# Patient Record
Sex: FEMALE | Race: BLACK OR AFRICAN AMERICAN | NOT HISPANIC OR LATINO | Employment: PART TIME | URBAN - METROPOLITAN AREA
[De-identification: names, ages, dates, MRNs, and addresses within clinical notes are randomized per-mention and may not be internally consistent; named-entity substitution may affect disease eponyms.]

---

## 2023-12-22 ENCOUNTER — HOSPITAL ENCOUNTER (EMERGENCY)
Facility: HOSPITAL | Age: 43
Discharge: HOME | End: 2023-12-22
Payer: COMMERCIAL

## 2023-12-22 VITALS
SYSTOLIC BLOOD PRESSURE: 106 MMHG | HEART RATE: 72 BPM | TEMPERATURE: 97.9 F | OXYGEN SATURATION: 100 % | WEIGHT: 135 LBS | HEIGHT: 66 IN | DIASTOLIC BLOOD PRESSURE: 54 MMHG | RESPIRATION RATE: 18 BRPM | BODY MASS INDEX: 21.69 KG/M2

## 2023-12-22 DIAGNOSIS — B34.9 VIRAL ILLNESS: Primary | ICD-10-CM

## 2023-12-22 LAB
FLUAV RNA RESP QL NAA+PROBE: NOT DETECTED
FLUBV RNA RESP QL NAA+PROBE: NOT DETECTED
PREGNANCY TEST URINE, POC: NEGATIVE
SARS-COV-2 RNA RESP QL NAA+PROBE: NOT DETECTED

## 2023-12-22 PROCEDURE — 2500000005 HC RX 250 GENERAL PHARMACY W/O HCPCS: Mod: SE | Performed by: PHYSICIAN ASSISTANT

## 2023-12-22 PROCEDURE — 81025 URINE PREGNANCY TEST: CPT | Performed by: PHYSICIAN ASSISTANT

## 2023-12-22 PROCEDURE — 87635 SARS-COV-2 COVID-19 AMP PRB: CPT | Performed by: PHYSICIAN ASSISTANT

## 2023-12-22 PROCEDURE — 99284 EMERGENCY DEPT VISIT MOD MDM: CPT | Performed by: PHYSICIAN ASSISTANT

## 2023-12-22 PROCEDURE — 99283 EMERGENCY DEPT VISIT LOW MDM: CPT

## 2023-12-22 RX ORDER — ONDANSETRON 4 MG/1
4 TABLET, ORALLY DISINTEGRATING ORAL EVERY 8 HOURS PRN
Qty: 30 TABLET | Refills: 0 | Status: SHIPPED | OUTPATIENT
Start: 2023-12-22

## 2023-12-22 RX ORDER — ACETAMINOPHEN 325 MG/1
650 TABLET ORAL EVERY 6 HOURS PRN
Qty: 30 TABLET | Refills: 0 | Status: SHIPPED | OUTPATIENT
Start: 2023-12-22

## 2023-12-22 RX ORDER — ONDANSETRON 4 MG/1
4 TABLET, ORALLY DISINTEGRATING ORAL ONCE
Status: COMPLETED | OUTPATIENT
Start: 2023-12-22 | End: 2023-12-22

## 2023-12-22 RX ORDER — ACETAMINOPHEN 325 MG/1
975 TABLET ORAL ONCE
Status: COMPLETED | OUTPATIENT
Start: 2023-12-22 | End: 2023-12-22

## 2023-12-22 RX ORDER — IBUPROFEN 600 MG/1
600 TABLET ORAL EVERY 6 HOURS PRN
Qty: 30 TABLET | Refills: 0 | OUTPATIENT
Start: 2023-12-22 | End: 2024-03-02

## 2023-12-22 RX ORDER — CYCLOBENZAPRINE HCL 10 MG
10 TABLET ORAL 3 TIMES DAILY PRN
Qty: 20 TABLET | Refills: 0 | Status: SHIPPED | OUTPATIENT
Start: 2023-12-22

## 2023-12-22 RX ADMIN — ONDANSETRON 4 MG: 4 TABLET, ORALLY DISINTEGRATING ORAL at 07:57

## 2023-12-22 RX ADMIN — ACETAMINOPHEN 975 MG: 325 TABLET ORAL at 07:57

## 2023-12-22 ASSESSMENT — COLUMBIA-SUICIDE SEVERITY RATING SCALE - C-SSRS
6. HAVE YOU EVER DONE ANYTHING, STARTED TO DO ANYTHING, OR PREPARED TO DO ANYTHING TO END YOUR LIFE?: NO
1. IN THE PAST MONTH, HAVE YOU WISHED YOU WERE DEAD OR WISHED YOU COULD GO TO SLEEP AND NOT WAKE UP?: NO
2. HAVE YOU ACTUALLY HAD ANY THOUGHTS OF KILLING YOURSELF?: NO

## 2023-12-22 ASSESSMENT — PAIN DESCRIPTION - LOCATION: LOCATION: ABDOMEN

## 2023-12-22 ASSESSMENT — PAIN SCALES - GENERAL: PAINLEVEL_OUTOF10: 6

## 2023-12-22 ASSESSMENT — PAIN - FUNCTIONAL ASSESSMENT: PAIN_FUNCTIONAL_ASSESSMENT: 0-10

## 2023-12-22 NOTE — Clinical Note
Erica Santos was seen and treated in our emergency department on 12/22/2023.  She may return to work on 12/24/2023.       If you have any questions or concerns, please don't hesitate to call.      Ilir Giraldo PA-C

## 2023-12-22 NOTE — ED PROVIDER NOTES
HPI:  43-year-old female otherwise healthy presents complaining of diarrhea.  States she had a couple episodes since began yesterday.  States she has some abdominal cramps with the diarrhea.  States she has mild diffuse myalgias worse in her back.  Denies any fevers, chills, night sweats or rigors.  Has some nausea without vomiting.  States the diarrhea is nonbloody.  Has not taken anything for her symptoms.  Denies any recent travel or sick contacts.  States she had a cough last week however that has resolved on its own.  Denies any chest pain or shortness of breath.  Denies alcohol tobacco or drug use    Physical Exam:   GEN: Vitals noted. NAD, very well-appearing able to comfortably  EYES:  EOMs grossly intact, anicteric sclera  LISS: Mucosa moist.  NECK: Supple.  CARD: RRR  PULMONARY: Moving air well. Clear all lung fields.  ABDOMEN: Soft, no guarding, no rigidity. Nontender. NABS  EXTREMITIES: Full ROM, no pitting edema,   SKIN: Intact, warm and dry  NEURO: Alert and oriented x 3, speech is clear, no obvious deficits noted.       ----------------------------------------------------------------------------------------------------------------------------    MDM:  43-year-old female presenting for diarrhea with myalgias.  On exam she is well-appearing ambulating comfortably.  Vital signs stable.  ABD soft NTTP with NABS.  Her constellation of symptoms reminiscent most likely a viral illness including COVID-19.  Will treat her symptomatically and test for influenza and COVID.  Urine pregnancy is negative.  With a benign abdomen well-appearing with stable vital signs, I have low suspicion for acute abdomen including low suspicion for SBO, appendicitis, cholecystitis, ischemic gut, diverticulitis, pancreatitis.  No indication for laboratory work or advanced imaging as she had only few episodes of the diarrhea and I do not suspect any electrolyte derangement additionally with her being very well-appearing with no signs  of clinical dehydration.  COVID and flu were negative.  On reevaluation states she felt greatly improved.  Felt comfortable to be discharged home.  She tolerated p.o. well without any difficulty.  Told to take Imodium for any diarrhea that is not accompanied with blood or fever.  To follow-up with PCP as needed.  Return precautions reviewed.    No orders to display     Labs Reviewed   SARS-COV-2 PCR, SYMPTOMATIC - Normal       Result Value    Coronavirus 2019, PCR Not Detected      Narrative:     This assay has received FDA Emergency Use Authorization (EUA) and is only authorized for the duration of time that circumstances exist to justify the authorization of the emergency use of in vitro diagnostic tests for the detection of SARS-CoV-2 virus and/or diagnosis of COVID-19 infection under section 564(b)(1) of the Act, 21 U.S.C. 360bbb-3(b)(1). This assay is an in vitro diagnostic nucleic acid amplification test for the qualitative detection of SARS-CoV-2 from nasopharyngeal specimens and has been validated for use at [facility]. Negative results do not preclude COVID-19 infections and should not be used as the sole basis for diagnosis, treatment, or other management decisions.     INFLUENZA A AND B PCR - Normal    Flu A Result Not Detected      Flu B Result Not Detected      Narrative:     This assay is an in vitro diagnostic multiplex nucleic acid amplification test for the detection and discrimination of Influenza A & B from nasopharyngeal specimens, and has been validated for use at [facility]. Negative results do not preclude Influenza A/B infections, and should not be used as the sole basis for diagnosis, treatment, or other management decisions. If Influenza A/B and RSV PCR results are negative, testing for Parainfluenza virus, Adenovirus and Metapneumovirus is routinely performed for [facility] pediatric oncology and intensive care inpatients, and is available on other  patients by placing an add-on request.   POCT PREGNANCY, URINE - Normal    Preg Test, Ur Negative       Diagnoses as of 12/22/23 0933   Viral illness     ----------------------------------------------------------------------------------------------------------------------------    This note was dictated using a speech recognition program.  While an attempt was made at proof reading to minimize errors, minor errors in transcription may be present call for questions.     Ilir Giraldo PA-C  12/22/23 0934

## 2023-12-22 NOTE — ED TRIAGE NOTES
Pt having abd pain and diarrhea since yesterday  
Spine appears normal, movement of extremities grossly intact.

## 2023-12-22 NOTE — DISCHARGE INSTRUCTIONS
Take medications to help your symptoms.  Get plenty rest and stay well-hydrated.  If you have persistent symptoms follow-up with your PCP, if you needed 1 call the number listed below.

## 2024-03-02 ENCOUNTER — APPOINTMENT (OUTPATIENT)
Dept: RADIOLOGY | Facility: HOSPITAL | Age: 44
End: 2024-03-02
Payer: COMMERCIAL

## 2024-03-02 ENCOUNTER — HOSPITAL ENCOUNTER (EMERGENCY)
Facility: HOSPITAL | Age: 44
Discharge: HOME | End: 2024-03-02
Attending: EMERGENCY MEDICINE
Payer: COMMERCIAL

## 2024-03-02 VITALS
DIASTOLIC BLOOD PRESSURE: 73 MMHG | HEART RATE: 100 BPM | HEIGHT: 66 IN | TEMPERATURE: 98.2 F | SYSTOLIC BLOOD PRESSURE: 121 MMHG | OXYGEN SATURATION: 100 % | RESPIRATION RATE: 14 BRPM | BODY MASS INDEX: 22.5 KG/M2 | WEIGHT: 140 LBS

## 2024-03-02 DIAGNOSIS — S29.012A STRAIN OF RHOMBOID MUSCLE, INITIAL ENCOUNTER: Primary | ICD-10-CM

## 2024-03-02 PROCEDURE — 99284 EMERGENCY DEPT VISIT MOD MDM: CPT | Performed by: EMERGENCY MEDICINE

## 2024-03-02 PROCEDURE — 2500000004 HC RX 250 GENERAL PHARMACY W/ HCPCS (ALT 636 FOR OP/ED): Mod: SE

## 2024-03-02 PROCEDURE — 73030 X-RAY EXAM OF SHOULDER: CPT | Mod: LEFT SIDE | Performed by: RADIOLOGY

## 2024-03-02 PROCEDURE — 96372 THER/PROPH/DIAG INJ SC/IM: CPT

## 2024-03-02 PROCEDURE — 99283 EMERGENCY DEPT VISIT LOW MDM: CPT

## 2024-03-02 PROCEDURE — 73030 X-RAY EXAM OF SHOULDER: CPT | Mod: LT

## 2024-03-02 RX ORDER — IBUPROFEN 600 MG/1
600 TABLET ORAL EVERY 6 HOURS PRN
Qty: 28 TABLET | Refills: 0 | Status: SHIPPED | OUTPATIENT
Start: 2024-03-02 | End: 2024-03-09

## 2024-03-02 RX ORDER — ACETAMINOPHEN 325 MG/1
650 TABLET ORAL ONCE
Status: COMPLETED | OUTPATIENT
Start: 2024-03-02 | End: 2024-03-02

## 2024-03-02 RX ORDER — KETOROLAC TROMETHAMINE 30 MG/ML
30 INJECTION, SOLUTION INTRAMUSCULAR; INTRAVENOUS ONCE
Status: COMPLETED | OUTPATIENT
Start: 2024-03-02 | End: 2024-03-02

## 2024-03-02 RX ORDER — LIDOCAINE 50 MG/G
1 PATCH TOPICAL DAILY
Qty: 12 PATCH | Refills: 0 | Status: SHIPPED | OUTPATIENT
Start: 2024-03-02 | End: 2024-04-01

## 2024-03-02 RX ORDER — LIDOCAINE 560 MG/1
1 PATCH PERCUTANEOUS; TOPICAL; TRANSDERMAL ONCE
Status: DISCONTINUED | OUTPATIENT
Start: 2024-03-02 | End: 2024-03-02 | Stop reason: HOSPADM

## 2024-03-02 RX ADMIN — ACETAMINOPHEN 650 MG: 325 TABLET ORAL at 17:12

## 2024-03-02 RX ADMIN — KETOROLAC TROMETHAMINE 30 MG: 30 INJECTION, SOLUTION INTRAMUSCULAR at 17:12

## 2024-03-02 ASSESSMENT — COLUMBIA-SUICIDE SEVERITY RATING SCALE - C-SSRS
2. HAVE YOU ACTUALLY HAD ANY THOUGHTS OF KILLING YOURSELF?: NO
6. HAVE YOU EVER DONE ANYTHING, STARTED TO DO ANYTHING, OR PREPARED TO DO ANYTHING TO END YOUR LIFE?: NO
1. IN THE PAST MONTH, HAVE YOU WISHED YOU WERE DEAD OR WISHED YOU COULD GO TO SLEEP AND NOT WAKE UP?: NO

## 2024-03-02 ASSESSMENT — PAIN - FUNCTIONAL ASSESSMENT: PAIN_FUNCTIONAL_ASSESSMENT: 0-10

## 2024-03-02 ASSESSMENT — PAIN SCALES - GENERAL: PAINLEVEL_OUTOF10: 8

## 2024-03-02 NOTE — ED PROVIDER NOTES
"HPI   Chief Complaint   Patient presents with    Shoulder Pain     Reports turning body awkwardly and now c/o left shoulder pain       HPI     Patient is a 43-year-old female with no reported past medical history presenting to the emergency department today for left shoulder pain. History is collected from patient. She states that yesterday around 9 PM, she was getting into her car when she turned her body and \"away\" resulting in posterior shoulder pain. She states that she did not hit her shoulder on anything and has never had shoulder problems in the past. She works at [employer] and was not able to go to work yesterday due to the being unable to lift things. She describes the pain as being crampy and behind her shoulder blade. Pain is not actually for the shoulder itself but rather medial to the scapula and lateral to the spine on the left side. She denies use of medications or any other known mechanisms of injury. Denies any history of allergies or other past medical history.               Felisha Coma Scale Score: 15                     Patient History   Past Medical History:   Diagnosis Date    37 weeks gestation of pregnancy 2022    37 weeks gestation of pregnancy    Encounter for pregnancy test, result negative 2018    Negative pregnancy test    Encounter for supervision of normal pregnancy, unspecified, third trimester 2022    Encounter for pregnancy related examination, third trimester    Personal history of other diseases of the digestive system 2015    History of hemorrhoids     Past Surgical History:   Procedure Laterality Date     SECTION, CLASSIC  2015     Section     SECTION, CLASSIC  2015     Section     No family history on file.  Social History     Tobacco Use    Smoking status: Every Day     Types: Cigarettes    Smokeless tobacco: Never   Substance Use Topics    Alcohol use: Not on file    Drug use: Not on file       Physical Exam "   ED Triage Vitals [03/02/24 1546]   Temperature Heart Rate Respirations BP   36.8 °C (98.2 °F) 100 14 121/73      Pulse Ox Temp Source Heart Rate Source Patient Position   100 % Temporal Monitor Sitting      BP Location FiO2 (%)     Right arm --       Physical Exam  Constitutional:       Appearance: Normal appearance.   HENT:      Head: Normocephalic and atraumatic.   Eyes:      Extraocular Movements: Extraocular movements intact.      Pupils: Pupils are equal, round, and reactive to light.   Cardiovascular:      Rate and Rhythm: Normal rate and regular rhythm.   Pulmonary:      Effort: Pulmonary effort is normal.      Breath sounds: Normal breath sounds.   Abdominal:      General: Abdomen is flat.      Palpations: Abdomen is soft.   Musculoskeletal:      Cervical back: Normal range of motion and neck supple. No tenderness.      Comments: There is tenderness in the recess between the medial portion of the left shoulder blade and the lateral spine overlying where the rhomboid muscles lie. No overlying ecchymoses or rashes. No bony tenderness or deformity. Patient is able to perform most shoulder maneuvers, but has severe pain on scapular retraction. Radial pulses +2 bilaterally and she is neurovascularly intact distal to her pain on the left upper extremity with 5 out of 5 motor strength in bilateral hands.   Neurological:      General: No focal deficit present.      Mental Status: She is alert and oriented to person, place, and time.         ED Course & MDM   Diagnoses as of 03/02/24 1756   Strain of rhomboid muscle, initial encounter       Medical Decision Making  Patient is a 43-year-old female with past medical history no reported past medical history presenting to the emergency department for left shoulder pain. On exam, there is no bony tenderness and she has neurovascularly intact in the left upper extremity. Given that pain is reproducible with scapular retraction and a low risk mechanism of injury, suspect  likely muscular etiology of pain. There are no overlying skin changes. As a precaution and due to patient's wishes, left upper extremity shoulder x-ray will be obtained. For symptomatic control, patient given 650 mg of Tylenol and 30 mg of IM Toradol. Patient also provided heating pack to place over left shoulder for symptomatic control. Lung sounds within normal limits and patient has no respiratory symptoms making pulmonary etiology unlikely. Additionally, patient's pain is reproducible with physical movements and when she is at rest does not have pain which would be uncharacteristic of cardiac etiology such as dissection. She is hemodynamically stable.  Left shoulder x-ray was obtained showing no evidence of osseous injury. Patient was notified of results and that symptoms are most consistent with muscle strain of likely her rhomboids given her pain on scapular retraction. For symptomatic control, patient will be discharged home with a prescription for ibuprofen and 5% lidocaine patches. As a result of the work-up, patient was discharged home.  They were informed of their diagnosis and instructed to come back with any concerns or worsening of condition and was agreeable to the plan as discussed above.  The patient was given the opportunity to ask questions.  All of the patient's questions were answered.  The patient remained stable under my care.      Procedure  Procedures     Dominick Liu MD  Resident  03/02/24 [id number]

## 2024-03-02 NOTE — Clinical Note
Erica Santos was seen and treated in our emergency department on 3/2/2024.  She may return to work on 03/05/2024.       If you have any questions or concerns, please don't hesitate to call.      Dominick Liu MD

## 2024-03-20 ENCOUNTER — APPOINTMENT (OUTPATIENT)
Dept: PRIMARY CARE | Facility: HOSPITAL | Age: 44
End: 2024-03-20
Payer: COMMERCIAL

## 2024-03-20 NOTE — PROGRESS NOTES
Erica Santos is a 43 y.o. with no significant PMH seen at [facility] on 03/20/24 for     Subjective:  Acute Concerns:   ***    HPI:     ROS:   ***    Past Medical History:     Past Surgical History:    Medications: *** (MVI, Vitamin D)    Current Outpatient Medications:     acetaminophen (TylenoL) 325 mg tablet, Take 2 tablets (650 mg) by mouth every 6 hours if needed for mild pain (1 - 3) or fever (temp greater than 38.0 C)., Disp: 30 tablet, Rfl: 0    cyclobenzaprine (Flexeril) 10 mg tablet, Take 1 tablet (10 mg) by mouth 3 times a day as needed for muscle spasms (Pain)., Disp: 20 tablet, Rfl: 0    lidocaine (Lidoderm) 5 % patch, Place 1 patch over 12 hours on the skin once daily. Apply one patch topically once a day for 30 days, Disp: 12 patch, Rfl: 0    ondansetron ODT (Zofran-ODT) 4 mg disintegrating tablet, Take 1 tablet (4 mg) by mouth every 8 hours if needed for nausea or vomiting. 1-2 tablets every 8 hours as needed for nausea and/or vomiting, Disp: 30 tablet, Rfl: 0  Pharmacy: ***    Objective:  Visit Vitals  Smoking Status Every Day        PHYSICAL EXAM:   General: well appearing, NAD, pleasant and engaged in encounter    HEENT: NCAT, MMM  CV: RRR, no m/r/g, cap refill <2 secs  PULM: CTAB, non-labored respirations, no wheezes/crackles/rhonchi auscultated  ABD: soft, NT, ND, no guarding or rebound tenderness  : no suprapubic or CVA tenderness   EXT: WWP, no significant edema noted in b/l LE's  SKIN: no rashes or lesions noted on trunk or extremities   NEURO: A&Ox4, symmetric facies, no gross motor or sensory deficits, normal gait  PSYCH: pleasant mood, appropriate affect for situation and context    Labs:  Recent Labs     08/16/22  0519 08/15/22  0842 08/13/22  1030   WBC 19.2* 8.4 8.4   HGB 8.7* 10.5* 10.8*   HCT 27.4* 32.9* 35.1*    239 251     Recent Labs     02/07/22  2046 07/15/18  1818    139   K 4.1 4.0    104   CO2 25 26   BUN 8 12   CREATININE 0.74 0.95   GLUCOSE  122* 86   CALCIUM 9.1 9.5   PHOS  --  3.4       Assessment & Plan:  Erica Santos is a 43 y.o. with PMH of *** seen at [facility] on 03/20/24 for follow-up.    There are no diagnoses linked to this encounter.       #Health Maintenance    Cancer Screening  Age Appropriate Screening   - Colonoscopy: due age 45    - Low dose Chest CT: N/A until age 50 if 20 Pack year hx    - DEXA scan: N/A until females >65 or <65 hx of hip fracture     -Mammogram: due age 50years, every 2 years    Laboratory Screening  - Lipid Screen: ***  - ASCVD Score: ***  - A1C, glucose screen: ***    Infectious dx  - STI-syphillis -negative 8/15/2022  -HIV-2/07/2022-neg  - Hep B screen: ***  - Hep C screen: non-reactive  -Pap smear -8/8/2015, 2/10/2022, due in 2025    Immunizations: ***  - Influenza  - COVID vaccine-Moderna 12/12/2021  - Tdap-done 2/22/2022  - Menactra, Men B  - HPV  - Prevnar, Pneumovax-not applicable until age 65     Laboratory Screening  - Lipid Screen: ***  - ASCVD Score: ***  - A1C, glucose screen: ***          Referrals: ***  Return to clinic in *** for follow-up.    Kati Ambriz-Berenice  Med-PGY2

## 2024-03-21 ENCOUNTER — PHARMACY VISIT (OUTPATIENT)
Dept: PHARMACY | Facility: CLINIC | Age: 44
End: 2024-03-21
Payer: MEDICAID

## 2024-03-21 ENCOUNTER — OFFICE VISIT (OUTPATIENT)
Dept: PRIMARY CARE | Facility: CLINIC | Age: 44
End: 2024-03-21
Payer: COMMERCIAL

## 2024-03-21 ENCOUNTER — LAB (OUTPATIENT)
Dept: LAB | Facility: LAB | Age: 44
End: 2024-03-21
Payer: COMMERCIAL

## 2024-03-21 VITALS
RESPIRATION RATE: 18 BRPM | BODY MASS INDEX: 23.09 KG/M2 | HEIGHT: 66 IN | TEMPERATURE: 97.6 F | HEART RATE: 98 BPM | SYSTOLIC BLOOD PRESSURE: 111 MMHG | OXYGEN SATURATION: 99 % | WEIGHT: 143.7 LBS | DIASTOLIC BLOOD PRESSURE: 59 MMHG

## 2024-03-21 DIAGNOSIS — L85.3 DRY SKIN: ICD-10-CM

## 2024-03-21 DIAGNOSIS — Z12.31 ENCOUNTER FOR SCREENING MAMMOGRAM FOR MALIGNANT NEOPLASM OF BREAST: ICD-10-CM

## 2024-03-21 DIAGNOSIS — Z23 ENCOUNTER FOR IMMUNIZATION: ICD-10-CM

## 2024-03-21 DIAGNOSIS — Z86.19 HX OF SCABIES: ICD-10-CM

## 2024-03-21 DIAGNOSIS — R53.83 FATIGUE, UNSPECIFIED TYPE: ICD-10-CM

## 2024-03-21 DIAGNOSIS — Z13.220 ENCOUNTER FOR LIPID SCREENING FOR CARDIOVASCULAR DISEASE: ICD-10-CM

## 2024-03-21 DIAGNOSIS — Z86.2 HX OF IRON DEFICIENCY ANEMIA: ICD-10-CM

## 2024-03-21 DIAGNOSIS — Z76.89 ENCOUNTER TO ESTABLISH CARE WITH NEW DOCTOR: ICD-10-CM

## 2024-03-21 DIAGNOSIS — Z13.6 ENCOUNTER FOR LIPID SCREENING FOR CARDIOVASCULAR DISEASE: ICD-10-CM

## 2024-03-21 DIAGNOSIS — L29.9 GENERALIZED PRURITUS: ICD-10-CM

## 2024-03-21 DIAGNOSIS — Z76.89 ENCOUNTER TO ESTABLISH CARE WITH NEW DOCTOR: Primary | ICD-10-CM

## 2024-03-21 LAB
ALBUMIN SERPL BCP-MCNC: 4.1 G/DL (ref 3.4–5)
ALP SERPL-CCNC: 52 U/L (ref 33–110)
ALT SERPL W P-5'-P-CCNC: 16 U/L (ref 7–45)
ANION GAP SERPL CALC-SCNC: 10 MMOL/L (ref 10–20)
AST SERPL W P-5'-P-CCNC: 14 U/L (ref 9–39)
BASOPHILS # BLD AUTO: 0.04 X10*3/UL (ref 0–0.1)
BASOPHILS NFR BLD AUTO: 0.7 %
BILIRUB SERPL-MCNC: 0.4 MG/DL (ref 0–1.2)
BUN SERPL-MCNC: 8 MG/DL (ref 6–23)
CALCIUM SERPL-MCNC: 9 MG/DL (ref 8.6–10.6)
CHLORIDE SERPL-SCNC: 106 MMOL/L (ref 98–107)
CHOLEST SERPL-MCNC: 116 MG/DL (ref 0–199)
CHOLESTEROL/HDL RATIO: 2.9
CO2 SERPL-SCNC: 28 MMOL/L (ref 21–32)
CREAT SERPL-MCNC: 1.04 MG/DL (ref 0.5–1.05)
EGFRCR SERPLBLD CKD-EPI 2021: 69 ML/MIN/1.73M*2
EOSINOPHIL # BLD AUTO: 0.14 X10*3/UL (ref 0–0.7)
EOSINOPHIL NFR BLD AUTO: 2.6 %
ERYTHROCYTE [DISTWIDTH] IN BLOOD BY AUTOMATED COUNT: 15.5 % (ref 11.5–14.5)
GLUCOSE SERPL-MCNC: 104 MG/DL (ref 74–99)
HCT VFR BLD AUTO: 36.4 % (ref 36–46)
HDLC SERPL-MCNC: 39.5 MG/DL
HGB BLD-MCNC: 10.9 G/DL (ref 12–16)
IMM GRANULOCYTES # BLD AUTO: 0.01 X10*3/UL (ref 0–0.7)
IMM GRANULOCYTES NFR BLD AUTO: 0.2 % (ref 0–0.9)
IRON SATN MFR SERPL: 5 % (ref 25–45)
IRON SERPL-MCNC: 18 UG/DL (ref 35–150)
LDLC SERPL CALC-MCNC: 64 MG/DL
LYMPHOCYTES # BLD AUTO: 1.73 X10*3/UL (ref 1.2–4.8)
LYMPHOCYTES NFR BLD AUTO: 31.9 %
MCH RBC QN AUTO: 26.9 PG (ref 26–34)
MCHC RBC AUTO-ENTMCNC: 29.9 G/DL (ref 32–36)
MCV RBC AUTO: 90 FL (ref 80–100)
MONOCYTES # BLD AUTO: 0.39 X10*3/UL (ref 0.1–1)
MONOCYTES NFR BLD AUTO: 7.2 %
NEUTROPHILS # BLD AUTO: 3.12 X10*3/UL (ref 1.2–7.7)
NEUTROPHILS NFR BLD AUTO: 57.4 %
NON HDL CHOLESTEROL: 77 MG/DL (ref 0–149)
NRBC BLD-RTO: 0 /100 WBCS (ref 0–0)
PLATELET # BLD AUTO: 317 X10*3/UL (ref 150–450)
POTASSIUM SERPL-SCNC: 4.4 MMOL/L (ref 3.5–5.3)
PROT SERPL-MCNC: 6.9 G/DL (ref 6.4–8.2)
RBC # BLD AUTO: 4.05 X10*6/UL (ref 4–5.2)
SODIUM SERPL-SCNC: 140 MMOL/L (ref 136–145)
TIBC SERPL-MCNC: 346 UG/DL (ref 240–445)
TRIGL SERPL-MCNC: 65 MG/DL (ref 0–149)
TSH SERPL-ACNC: 1.33 MIU/L (ref 0.44–3.98)
UIBC SERPL-MCNC: 328 UG/DL (ref 110–370)
VLDL: 13 MG/DL (ref 0–40)
WBC # BLD AUTO: 5.4 X10*3/UL (ref 4.4–11.3)

## 2024-03-21 PROCEDURE — 83540 ASSAY OF IRON: CPT

## 2024-03-21 PROCEDURE — 80053 COMPREHEN METABOLIC PANEL: CPT

## 2024-03-21 PROCEDURE — 83550 IRON BINDING TEST: CPT

## 2024-03-21 PROCEDURE — 90677 PCV20 VACCINE IM: CPT

## 2024-03-21 PROCEDURE — RXMED WILLOW AMBULATORY MEDICATION CHARGE

## 2024-03-21 PROCEDURE — 84443 ASSAY THYROID STIM HORMONE: CPT

## 2024-03-21 PROCEDURE — 85025 COMPLETE CBC W/AUTO DIFF WBC: CPT

## 2024-03-21 PROCEDURE — 90471 IMMUNIZATION ADMIN: CPT

## 2024-03-21 PROCEDURE — 99204 OFFICE O/P NEW MOD 45 MIN: CPT

## 2024-03-21 PROCEDURE — 80061 LIPID PANEL: CPT

## 2024-03-21 PROCEDURE — 36415 COLL VENOUS BLD VENIPUNCTURE: CPT

## 2024-03-21 RX ORDER — PERMETHRIN 50 MG/G
CREAM TOPICAL ONCE
Qty: 60 G | Refills: 1 | Status: SHIPPED | OUTPATIENT
Start: 2024-03-21 | End: 2024-03-22

## 2024-03-21 RX ORDER — MAG HYDROX/ALUMINUM HYD/SIMETH 200-200-20
SUSPENSION, ORAL (FINAL DOSE FORM) ORAL 2 TIMES DAILY
Qty: 28 G | Refills: 1 | Status: SHIPPED | OUTPATIENT
Start: 2024-03-21

## 2024-03-21 ASSESSMENT — PAIN SCALES - GENERAL: PAINLEVEL: 0-NO PAIN

## 2024-03-21 NOTE — PROGRESS NOTES
Precepting Note  Erica Santos  [id number]    Patient was seen in [facility] Family Medicine residency clinic today as part of a multidisciplinary teaching team. I participated in this patient's care as a giuseppe precepting physician.    44 yo F here to establish care. Has full body skin rash, unclear etiology. Likely scabies from prior evaluations, will trial another course of permethrin cream. Advised to take 4 time and will follow up.    Agreeable with plan as discussed with resident Jesus Triplett MD and attending provider, Dr. Wills.      Heather Vigil MD  Family Medicine, PGY-3

## 2024-03-21 NOTE — PROGRESS NOTES
Subjective   Patient ID: Erica Santos is a 43 y.o. female who presents for Follow-up (ER visit) and Med Refill ( needs a prescription of ivermectin. ).    HPI   Patient reports that she previously scabies and is concerned that she still has it.  Reports that she obtained an second opinion from dermatologist during a virtual visit.  Patient reports she did a treatment one time when she was initially diagnosed a long time ago, but is unsure what she was given previously.  Reports that she has cleaned her house and clothing multiple times, and that of everyone in her household.     Fatigue  - patient endorse that gets her 8 hours of sleep but has been feeling more fatigue   - reports that she regularly works 12 hours shifts with [employer], and following shift may need to take a nap in her car   - denies constipation, palpitation, excessive sleepiness     Mhx: denies   Shx: 2x C sections and b/l tubal ligation following 2nd   Obhx  (patient report had small birth canal) currently on menstrual cycle (started today) occur regularly every 30 days    GynHx: abnormal pap in 2006, most recent non abnormalities; History of + gonorrhea and chlamydia in 20s  Sexual active with one partner   Family History: Mom  of lung cancer, believes father has prostate cancer; little sister is diabetic; brother has asthma, maternal grandmother had leukemia, paternal family history of diabetes; paternal aunt  of breast cancer; reports familial history of multiple types of cancer and she is not sure about many of them   Meds: denies   Allergies: penicillin and shelf fish  Diet: patient reports eating 2 meals per day drinks a lot of water   Exercise: daily with walking and running, stretching  Work: [employer]    Tobacco: 3-4 cigarettes per day (been smoking since 19 y/o old previous packs per day); interested in cessation but not currently   EtOH: occasionally (holidays)  Illicit drugs: Marijuana daily, denies any other  "drugs     Flu shot in October at work, endorse uptodate with all vaccine; patient amenable to getting pneumon shot     Patient endorses have never had mammogram   Pap smear unsure when most recent     Review of Systems  ROS negative unless noted in HPI     Objective   /59 (BP Location: Left arm, Patient Position: Sitting, BP Cuff Size: Adult)   Pulse 98   Temp 36.4 °C (97.6 °F) (Temporal)   Resp 18   Ht 1.676 m (5' 6\")   Wt 65.2 kg (143 lb 11.2 oz)   SpO2 99%   BMI 23.19 kg/m²     Physical Exam  Vitals reviewed.   Constitutional:       General: She is not in acute distress.     Appearance: Normal appearance. She is normal weight. She is not ill-appearing, toxic-appearing or diaphoretic.      Comments: Odor of smoke and cannabis in room    HENT:      Head: Normocephalic and atraumatic.      Right Ear: External ear normal.      Left Ear: External ear normal.      Nose: Nose normal.      Mouth/Throat:      Mouth: Mucous membranes are moist.      Pharynx: Oropharynx is clear.   Eyes:      Conjunctiva/sclera: Conjunctivae normal.   Cardiovascular:      Rate and Rhythm: Normal rate and regular rhythm.      Pulses: Normal pulses.      Heart sounds: Normal heart sounds. No murmur heard.     No friction rub. No gallop.   Pulmonary:      Effort: Pulmonary effort is normal. No respiratory distress.      Breath sounds: Normal breath sounds. No stridor. No wheezing, rhonchi or rales.   Chest:      Chest wall: No tenderness.   Abdominal:      General: Abdomen is flat. Bowel sounds are normal. There is no distension.      Palpations: Abdomen is soft.      Tenderness: There is no abdominal tenderness. There is no guarding.   Musculoskeletal:      Cervical back: Normal range of motion and neck supple.   Skin:     General: Skin is dry.      Capillary Refill: Capillary refill takes less than 2 seconds.      Coloration: Skin is not jaundiced or pale.      Findings: Lesion present. No bruising, erythema or rash.      " Comments: Skin noted to be significantly dry and circular lesions in various stages of healing all over patient exposed arms and neck; non-tender to touch, w/o signs of erythema or purulent drainage    Neurological:      General: No focal deficit present.      Mental Status: She is alert and oriented to person, place, and time.      Cranial Nerves: No cranial nerve deficit.      Sensory: No sensory deficit.      Motor: No weakness.       Assessment/Plan   Erica Santos is 42 y/o F w/ nicotine use disorder and cannabis, as well as history of JESU presented today to establish care with new PCP with acute concerns of ongoing scabies infestation and fatigue.  In addition, patient has significant family history of various types of cancer.  Recommended screening for neoplasm of breast tissue, which patient amenable.  IN addition, due to patient significant smoking history with continued use recommended pneumococcal vaccination, and patient amenable.  Rest of plan below:    Diagnoses and all orders for this visit:  Encounter to establish care with new doctor  -     Comprehensive Metabolic Panel; Future  Dry skin  Generalized pruritus  - patient noted to have dry skin on PE, with complaints of significant generalized pruritus  - Recommend patient utilize daily moisturizer, endorse will obtain OTC moisturizer per preference   - Due to significant pruritis will order steroid ointment to be applied daily   - Will also check CMP to evaluate for systemic contribution to pruritis    -     hydrocortisone 1 % ointment; Apply topically 2 times a day.  Hx of scabies  - Concerns patient had incomplete treatment for previous scabies infection, with PE significant for various circular lesions  - will order permethrin and recommend patient trial of multiple treatments  -     permethrin (Elimite) 5 % cream; Apply topically 1 time for 1 dose. Apply to skin from hairline to toes and wash off 8-10 hours later.  Encounter for immunization  -      Pneumococcal conjugate vaccine, 20-valent (PREVNAR 20)  Hx of iron deficiency anemia  Fatigue, unspecified type  - DDX: JESU vs thyroid disorder vs metabolic systemic derangements   -     Tsh With Reflex To Free T4 If Abnormal; Future  -     CBC and Auto Differential; Future  -     Iron and TIBC; Future  -     Comprehensive Metabolic Panel; Future  Encounter for screening mammogram for malignant neoplasm of breast  -     BI mammo bilateral screening tomosynthesis; Future  Encounter for lipid screening for cardiovascular disease  -     Lipid panel; Future  Other orders  -     Follow Up In Primary Care - Established; Future     **RTC in 6 weeks for follow up on multiple medical complaints, smoking cessation and annual physical if able, or sooner if needed     Patient was examined and discussed with Dr. Elma Triplett MD MS  PGY-2 Family Medicine

## 2024-03-24 PROBLEM — F12.91 HISTORY OF MARIJUANA USE: Status: ACTIVE | Noted: 2024-03-24

## 2024-03-24 PROBLEM — F17.200 SMOKES TOBACCO DAILY: Status: ACTIVE | Noted: 2024-03-24

## 2024-03-26 NOTE — PROGRESS NOTES
I saw and evaluated the patient. I personally obtained the key and critical portions of the history and physical exam or was physically present for key and critical portions performed by the resident/fellow. I reviewed the resident/fellow's documentation and discussed the patient with the resident/fellow. I agree with the resident/fellow's medical decision making as documented in the note.    I would characterize patient's affect as expansive. She does relate that she has completed the permethrin ointment treatment at least once several months ago. But she also describes using it in a conservative fashion, only placing it on lesions, in place of the entire surface of skin. We will proceed with at least 3 applications. I instructed her that if she does this correctly, and lesions persist, then this is not scabies.     Damian Wills MD

## 2024-03-29 ENCOUNTER — TELEPHONE (OUTPATIENT)
Dept: PRIMARY CARE | Facility: CLINIC | Age: 44
End: 2024-03-29
Payer: COMMERCIAL

## 2024-03-29 DIAGNOSIS — D50.8 IRON DEFICIENCY ANEMIA SECONDARY TO INADEQUATE DIETARY IRON INTAKE: Primary | ICD-10-CM

## 2024-03-29 PROBLEM — D50.9 IRON DEFICIENCY ANEMIA: Status: ACTIVE | Noted: 2024-03-29

## 2024-03-29 PROBLEM — Z86.19: Status: ACTIVE | Noted: 2024-03-29

## 2024-03-29 RX ORDER — FERROUS GLUCONATE 324(38)MG
38 TABLET ORAL
Qty: 15 TABLET | Refills: 11 | Status: SHIPPED | OUTPATIENT
Start: 2024-03-29 | End: 2025-03-29

## 2024-03-29 NOTE — TELEPHONE ENCOUNTER
Result Communication    Resulted Orders   Tsh With Reflex To Free T4 If Abnormal   Result Value Ref Range    Thyroid Stimulating Hormone 1.33 0.44 - 3.98 mIU/L    Narrative    TSH testing is performed using different testing methodology at [facility] than at other [facility] hospitals. Direct result comparisons should only be made within the same method.     CBC and Auto Differential   Result Value Ref Range    WBC 5.4 4.4 - 11.3 x10*3/uL    nRBC 0.0 0.0 - 0.0 /100 WBCs    RBC 4.05 4.00 - 5.20 x10*6/uL    Hemoglobin 10.9 (L) 12.0 - 16.0 g/dL    Hematocrit 36.4 36.0 - 46.0 %    MCV 90 80 - 100 fL    MCH 26.9 26.0 - 34.0 pg    MCHC 29.9 (L) 32.0 - 36.0 g/dL    RDW 15.5 (H) 11.5 - 14.5 %    Platelets 317 150 - 450 x10*3/uL    Neutrophils % 57.4 40.0 - 80.0 %    Immature Granulocytes %, Automated 0.2 0.0 - 0.9 %      Comment:      Immature Granulocyte Count (IG) includes promyelocytes, myelocytes and metamyelocytes but does not include bands. Percent differential counts (%) should be interpreted in the context of the absolute cell counts (cells/UL).    Lymphocytes % 31.9 13.0 - 44.0 %    Monocytes % 7.2 2.0 - 10.0 %    Eosinophils % 2.6 0.0 - 6.0 %    Basophils % 0.7 0.0 - 2.0 %    Neutrophils Absolute 3.12 1.20 - 7.70 x10*3/uL      Comment:      Percent differential counts (%) should be interpreted in the context of the absolute cell counts (cells/uL).    Immature Granulocytes Absolute, Automated 0.01 0.00 - 0.70 x10*3/uL    Lymphocytes Absolute 1.73 1.20 - 4.80 x10*3/uL    Monocytes Absolute 0.39 0.10 - 1.00 x10*3/uL    Eosinophils Absolute 0.14 0.00 - 0.70 x10*3/uL    Basophils Absolute 0.04 0.00 - 0.10 x10*3/uL   Iron and TIBC   Result Value Ref Range    Iron 18 (L) 35 - 150 ug/dL    UIBC 328 110 - 370 ug/dL    TIBC 346 240 - 445 ug/dL    % Saturation 5 (L) 25 - 45 %   Comprehensive Metabolic Panel   Result Value Ref Range    Glucose 104 (H) 74 - 99 mg/dL    Sodium 140 136 - 145 mmol/L    Potassium 4.4 3.5  - 5.3 mmol/L    Chloride 106 98 - 107 mmol/L    Bicarbonate 28 21 - 32 mmol/L    Anion Gap 10 10 - 20 mmol/L    Urea Nitrogen 8 6 - 23 mg/dL    Creatinine 1.04 0.50 - 1.05 mg/dL    eGFR 69 >60 mL/min/1.73m*2      Comment:      Calculations of estimated GFR are performed using the 2021 CKD-EPI Study Refit equation without the race variable for the IDMS-Traceable creatinine methods.  https://jasn.asnjournals.org/content/early/2021/09/22/ASN.6152750119    Calcium 9.0 8.6 - 10.6 mg/dL    Albumin 4.1 3.4 - 5.0 g/dL    Alkaline Phosphatase 52 33 - 110 U/L    Total Protein 6.9 6.4 - 8.2 g/dL    AST 14 9 - 39 U/L    Bilirubin, Total 0.4 0.0 - 1.2 mg/dL    ALT 16 7 - 45 U/L      Comment:      Patients treated with Sulfasalazine may generate falsely decreased results for ALT.   Lipid panel   Result Value Ref Range    Cholesterol 116 0 - 199 mg/dL      Comment:            Age      Desirable   Borderline High   High     0-19 Y     0 - 169       170 - 199     >/= 200    20-24 Y     0 - 189       190 - 224     >/= 225         >24 Y     0 - 199       200 - 239     >/= 240   **All ranges are based on fasting samples. Specific   therapeutic targets will vary based on patient-specific   cardiac risk.    Pediatric guidelines reference:Pediatrics 2011, 128(S5).Adult guidelines reference: NCEP ATPIII Guidelines,CRISTHIAN 2001, 258:2486-97    Venipuncture immediately after or during the administration of Metamizole may lead to falsely low results. Testing should be performed immediately prior to Metamizole dosing.    HDL-Cholesterol 39.5 mg/dL      Comment:        Age       Very Low   Low     Normal    High    0-19 Y    < 35      < 40     40-45     ----  20-24 Y    ----     < 40      >45      ----        >24 Y      ----     < 40     40-60      >60      Cholesterol/HDL Ratio 2.9       Comment:        Ref Values  Desirable  < 3.4  High Risk  > 5.0    LDL Calculated 64 <=99 mg/dL      Comment:                                  Near   Borderline       AGE      Desirable  Optimal    High     High     Very High     0-19 Y     0 - 109     ---    110-129   >/= 130     ----    20-24 Y     0 - 119     ---    120-159   >/= 160     ----      >24 Y     0 -  99   100-129  130-159   160-189     >/=190      VLDL 13 0 - 40 mg/dL    Triglycerides 65 0 - 149 mg/dL      Comment:         Age         Desirable   Borderline High   High     Very High   0 D-90 D    19 - 174         ----         ----        ----  91 D- 9 Y     0 -  74        75 -  99     >/= 100      ----    10-19 Y     0 -  89        90 - 129     >/= 130      ----    20-24 Y     0 - 114       115 - 149     >/= 150      ----         >24 Y     0 - 149       150 - 199    200- 499    >/= 500    Venipuncture immediately after or during the administration of Metamizole may lead to falsely low results. Testing should be performed immediately prior to Metamizole dosing.    Non HDL Cholesterol 77 0 - 149 mg/dL      Comment:            Age       Desirable   Borderline High   High     Very High     0-19 Y     0 - 119       120 - 144     >/= 145    >/= 160    20-24 Y     0 - 149       150 - 189     >/= 190      ----         >24 Y    30 mg/dL above LDL Cholesterol goal         2:25 PM      Results were successfully communicated with the patient and they acknowledged their understanding.  Patient was identified by full name and .  Discussed with patient that blood work was demonstrating that she continues have an iron deficiency anemia, which may explain her ongoing symptoms of fatigue.  Discussed with patient that would recommend starting treatment with iron supplementation on an every other day dosing and that she take the medication with orange juice to help with absorption.  Patient acknowledged understanding, and requested that the iron pills be sent to the [facility] pharmacy.  Discussed the rest of the labs were within normal limits.      JESSICA Triplett MD MS  PGY-2 Family Medicine

## 2024-05-16 ENCOUNTER — APPOINTMENT (OUTPATIENT)
Dept: PRIMARY CARE | Facility: CLINIC | Age: 44
End: 2024-05-16
Payer: COMMERCIAL